# Patient Record
Sex: MALE | Race: WHITE | NOT HISPANIC OR LATINO | Employment: FULL TIME | URBAN - METROPOLITAN AREA
[De-identification: names, ages, dates, MRNs, and addresses within clinical notes are randomized per-mention and may not be internally consistent; named-entity substitution may affect disease eponyms.]

---

## 2023-08-17 ENCOUNTER — PATIENT MESSAGE (OUTPATIENT)
Dept: ADMINISTRATIVE | Facility: HOSPITAL | Age: 63
End: 2023-08-17
Payer: MEDICARE

## 2023-08-17 ENCOUNTER — OFFICE VISIT (OUTPATIENT)
Dept: PRIMARY CARE CLINIC | Facility: CLINIC | Age: 63
End: 2023-08-17
Payer: MEDICARE

## 2023-08-17 VITALS
HEART RATE: 67 BPM | SYSTOLIC BLOOD PRESSURE: 136 MMHG | BODY MASS INDEX: 27.86 KG/M2 | OXYGEN SATURATION: 97 % | WEIGHT: 177.5 LBS | DIASTOLIC BLOOD PRESSURE: 70 MMHG | HEIGHT: 67 IN

## 2023-08-17 DIAGNOSIS — Z87.39 HISTORY OF GOUT: ICD-10-CM

## 2023-08-17 DIAGNOSIS — Z79.899 ENCOUNTER FOR LONG-TERM (CURRENT) USE OF MEDICATIONS: ICD-10-CM

## 2023-08-17 DIAGNOSIS — N40.1 BENIGN PROSTATIC HYPERPLASIA WITH NOCTURIA: ICD-10-CM

## 2023-08-17 DIAGNOSIS — K21.9 GASTROESOPHAGEAL REFLUX DISEASE WITHOUT ESOPHAGITIS: ICD-10-CM

## 2023-08-17 DIAGNOSIS — R35.1 BENIGN PROSTATIC HYPERPLASIA WITH NOCTURIA: ICD-10-CM

## 2023-08-17 DIAGNOSIS — S61.012A LACERATION OF LEFT THUMB WITHOUT FOREIGN BODY WITHOUT DAMAGE TO NAIL, INITIAL ENCOUNTER: ICD-10-CM

## 2023-08-17 DIAGNOSIS — Z11.59 SCREENING FOR VIRAL DISEASE: ICD-10-CM

## 2023-08-17 DIAGNOSIS — E78.2 MIXED HYPERLIPIDEMIA: ICD-10-CM

## 2023-08-17 DIAGNOSIS — Z00.00 ANNUAL PHYSICAL EXAM: Primary | ICD-10-CM

## 2023-08-17 DIAGNOSIS — F51.01 PRIMARY INSOMNIA: ICD-10-CM

## 2023-08-17 DIAGNOSIS — Z80.42 FAMILY HISTORY OF MALIGNANT NEOPLASM OF PROSTATE: ICD-10-CM

## 2023-08-17 DIAGNOSIS — I10 ESSENTIAL HYPERTENSION: ICD-10-CM

## 2023-08-17 PROBLEM — M10.9 GOUT: Status: ACTIVE | Noted: 2023-08-17

## 2023-08-17 PROCEDURE — 3075F PR MOST RECENT SYSTOLIC BLOOD PRESS GE 130-139MM HG: ICD-10-PCS | Mod: CPTII,S$GLB,, | Performed by: INTERNAL MEDICINE

## 2023-08-17 PROCEDURE — 1159F MED LIST DOCD IN RCRD: CPT | Mod: CPTII,S$GLB,, | Performed by: INTERNAL MEDICINE

## 2023-08-17 PROCEDURE — 3008F PR BODY MASS INDEX (BMI) DOCUMENTED: ICD-10-PCS | Mod: CPTII,S$GLB,, | Performed by: INTERNAL MEDICINE

## 2023-08-17 PROCEDURE — 3008F BODY MASS INDEX DOCD: CPT | Mod: CPTII,S$GLB,, | Performed by: INTERNAL MEDICINE

## 2023-08-17 PROCEDURE — 1159F PR MEDICATION LIST DOCUMENTED IN MEDICAL RECORD: ICD-10-PCS | Mod: CPTII,S$GLB,, | Performed by: INTERNAL MEDICINE

## 2023-08-17 PROCEDURE — 99999 PR PBB SHADOW E&M-NEW PATIENT-LVL III: ICD-10-PCS | Mod: PBBFAC,,, | Performed by: INTERNAL MEDICINE

## 2023-08-17 PROCEDURE — 99396 PR PREVENTIVE VISIT,EST,40-64: ICD-10-PCS | Mod: S$GLB,,, | Performed by: INTERNAL MEDICINE

## 2023-08-17 PROCEDURE — 3075F SYST BP GE 130 - 139MM HG: CPT | Mod: CPTII,S$GLB,, | Performed by: INTERNAL MEDICINE

## 2023-08-17 PROCEDURE — 99999 PR PBB SHADOW E&M-NEW PATIENT-LVL III: CPT | Mod: PBBFAC,,, | Performed by: INTERNAL MEDICINE

## 2023-08-17 PROCEDURE — 3078F PR MOST RECENT DIASTOLIC BLOOD PRESSURE < 80 MM HG: ICD-10-PCS | Mod: CPTII,S$GLB,, | Performed by: INTERNAL MEDICINE

## 2023-08-17 PROCEDURE — 3078F DIAST BP <80 MM HG: CPT | Mod: CPTII,S$GLB,, | Performed by: INTERNAL MEDICINE

## 2023-08-17 PROCEDURE — 99396 PREV VISIT EST AGE 40-64: CPT | Mod: S$GLB,,, | Performed by: INTERNAL MEDICINE

## 2023-08-17 RX ORDER — DOXAZOSIN 2 MG/1
2 TABLET ORAL NIGHTLY
COMMUNITY

## 2023-08-17 RX ORDER — OMEPRAZOLE 20 MG/1
20 CAPSULE, DELAYED RELEASE ORAL DAILY
Qty: 90 CAPSULE | Refills: 3 | Status: SHIPPED | OUTPATIENT
Start: 2023-08-17

## 2023-08-17 RX ORDER — AMLODIPINE BESYLATE 5 MG/1
5 TABLET ORAL DAILY
Qty: 90 TABLET | Refills: 3 | Status: SHIPPED | OUTPATIENT
Start: 2023-08-17

## 2023-08-17 RX ORDER — ALLOPURINOL 300 MG/1
300 TABLET ORAL DAILY
Qty: 90 TABLET | Refills: 3 | Status: SHIPPED | OUTPATIENT
Start: 2023-08-17

## 2023-08-17 RX ORDER — ATORVASTATIN CALCIUM 10 MG/1
10 TABLET, FILM COATED ORAL DAILY
Qty: 90 TABLET | Refills: 3 | Status: SHIPPED | OUTPATIENT
Start: 2023-08-17

## 2023-08-17 NOTE — PROGRESS NOTES
[facility] Note    Chief Complaint      Chief Complaint   Patient presents with    Establish Care     History of Present Illness      Nolan Mccarthy is a 62 y.o. male who presents today for Annual preventative visit.  Patient comes to appointment alone.  GI: Shea, Uro: Gadiel    Patient is a nonsmoker.  Patient reports wearing seatbelt when riding in a car.  Patient denies falls since last visit and does not use a mobility aid.      Has laceration on thumb from using  last night, UTD on TDap.    Problem List Items Addressed This Visit       Gastroesophageal reflux disease without esophagitis    Current Assessment & Plan     Stable on prilosec 20 mg, no reflux at present. Had EGD with Dr. Valdivia, has hiatal hernia.  Due for both scopes in 2025.         Mixed hyperlipidemia    Current Assessment & Plan     Stable on lipitor 10 mg daily, no myalgias.  The 10-year ASCVD risk score (Aixa SANDOVAL, et al., 2019) is: 17.1%    Values used to calculate the score:      Age: 62 years      Sex: Male      Is Non- : No      Diabetic: No      Tobacco smoker: Yes      Systolic Blood Pressure: 136 mmHg      Is BP treated: Yes      HDL Cholesterol: 50 mg/dL      Total Cholesterol: 146 mg/dL           Relevant Orders    CBC Auto Differential    Lipid Panel    Comprehensive Metabolic Panel    Essential hypertension    Current Assessment & Plan     BP controlled on norvasc 5 mg, no CP/SOB.         History of gout    Current Assessment & Plan     Stable on allopurinol, no flare at present.         Relevant Medications    allopurinoL (ZYLOPRIM) 300 MG tablet    Other Relevant Orders    URIC ACID    Family history of malignant neoplasm of prostate    Current Assessment & Plan     In brother, sees Dr. Ramesh and gets PSA.           Benign prostatic hyperplasia with nocturia    Current Assessment & Plan     Stable on Cardura, sees Dr. Ramesh.         Primary insomnia    Current  Assessment & Plan     Has trouble staying asleep, no issues staying asleep.  Takes CBD gummies which seem to help.          Other Visit Diagnoses       Annual physical exam    -  Primary    Laceration of left thumb without foreign body without damage to nail, initial encounter        Encounter for long-term (current) use of medications        Relevant Orders    Hemoglobin A1C    Screening for viral disease        Relevant Orders    Hepatitis C Antibody            Health Maintenance   Topic Date Due    Hepatitis C Screening  Never done    Colorectal Cancer Screening  Never done    PROSTATE-SPECIFIC ANTIGEN  09/12/2015    Lipid Panel  06/28/2027    TETANUS VACCINE  02/21/2028    Shingles Vaccine  Completed       Past Medical History:   Diagnosis Date    BMI 28.0-28.9,adult     Hyperlipidemia     Hypertension     Syncope        Past Surgical History:   Procedure Laterality Date    COSMETIC SURGERY      EYE SURGERY      NONE      VASECTOMY         family history includes Cancer in his brother and father.    Social History     Tobacco Use    Smoking status: Some Days     Current packs/day: 0.00     Types: Cigars    Smokeless tobacco: Never   Substance Use Topics    Alcohol use: Yes     Alcohol/week: 11.0 standard drinks of alcohol     Types: 2 Glasses of wine, 6 Cans of beer, 3 Shots of liquor per week     Comment: SOCIALLY    Drug use: Yes     Frequency: 2.0 times per week     Types: Marijuana       Review of Systems   Constitutional:  Negative for chills and fever.   HENT:  Negative for hearing loss.    Eyes:  Negative for discharge.   Respiratory:  Negative for cough, shortness of breath and wheezing.    Cardiovascular:  Negative for chest pain and palpitations.   Gastrointestinal:  Negative for blood in stool, constipation, diarrhea, nausea and vomiting.   Genitourinary:  Negative for dysuria, hematuria and urgency.   Musculoskeletal:  Negative for falls and neck pain.   Neurological:  Negative for weakness and  "headaches.   Endo/Heme/Allergies:  Negative for polydipsia.        Outpatient Encounter Medications as of 8/17/2023   Medication Sig Dispense Refill    doxazosin (CARDURA) 2 MG tablet Take 2 mg by mouth every evening.      fish oil-omega-3 fatty acids 300-1,000 mg capsule Take 2 g by mouth once daily.      multivitamin (MULTIVITAMIN) per tablet Take 1 tablet by mouth once daily.        [DISCONTINUED] allopurinol (ZYLOPRIM) 300 MG tablet TAKE 1 TABLET DAILY 90 tablet 2    [DISCONTINUED] amlodipine (NORVASC) 5 MG tablet Take 5 mg by mouth once daily.        [DISCONTINUED] atorvastatin (LIPITOR) 10 MG tablet Take 10 mg by mouth once daily.        [DISCONTINUED] omeprazole (PRILOSEC) 20 MG capsule Take 20 mg by mouth once daily.        allopurinoL (ZYLOPRIM) 300 MG tablet Take 1 tablet (300 mg total) by mouth once daily. 90 tablet 3    amLODIPine (NORVASC) 5 MG tablet Take 1 tablet (5 mg total) by mouth once daily. 90 tablet 3    atorvastatin (LIPITOR) 10 MG tablet Take 1 tablet (10 mg total) by mouth once daily. 90 tablet 3    omeprazole (PRILOSEC) 20 MG capsule Take 1 capsule (20 mg total) by mouth once daily. 90 capsule 3    [DISCONTINUED] aspirin (ECOTRIN) 81 MG EC tablet Take 81 mg by mouth once daily.         No facility-administered encounter medications on file as of 8/17/2023.        Review of patient's allergies indicates:  No Known Allergies    Physical Exam      Vital Signs  Pulse: 67  SpO2: 97 %  BP: 136/70  Pain Score: 0-No pain  Height and Weight  Height: 5' 7" (170.2 cm)  Weight: 80.5 kg (177 lb 7.5 oz)  BSA (Calculated - sq m): 1.95 sq meters  BMI (Calculated): 27.8  Weight in (lb) to have BMI = 25: 159.3]    Physical Exam  Constitutional:       Appearance: He is well-developed.   HENT:      Head: Normocephalic and atraumatic.   Neck:      Thyroid: No thyromegaly.   Cardiovascular:      Rate and Rhythm: Normal rate and regular rhythm.      Heart sounds: No murmur heard.  Pulmonary:      Effort: " "Pulmonary effort is normal. No respiratory distress.      Breath sounds: Normal breath sounds.   Abdominal:      General: There is no distension.      Palpations: Abdomen is soft.      Tenderness: There is no abdominal tenderness.   Skin:     General: Skin is warm and dry.   Neurological:      Mental Status: He is alert and oriented to person, place, and time.   Psychiatric:         Behavior: Behavior normal.          Laboratory:  CBC:  No results for input(s): "WBC", "RBC", "HGB", "HCT", "PLT", "MCV", "MCH", "MCHC" in the last 2160 hours.  CMP:  No results for input(s): "GLU", "CALCIUM", "ALBUMIN", "PROT", "NA", "K", "CO2", "CL", "BUN", "ALKPHOS", "ALT", "AST", "BILITOT" in the last 2160 hours.    Invalid input(s): "CREATININ"  URINALYSIS:  No results for input(s): "COLORU", "CLARITYU", "SPECGRAV", "PHUR", "PROTEINUA", "GLUCOSEU", "BILIRUBINCON", "BLOODU", "WBCU", "RBCU", "BACTERIA", "MUCUS", "NITRITE", "LEUKOCYTESUR", "UROBILINOGEN", "HYALINECASTS" in the last 2160 hours.   LIPIDS:  No results for input(s): "TSH", "HDL", "CHOL", "TRIG", "LDLCALC", "CHOLHDL", "NONHDLCHOL", "TOTALCHOLEST" in the last 2160 hours.  TSH:  No results for input(s): "TSH" in the last 2160 hours.  A1C:  No results for input(s): "HGBA1C" in the last 2160 hours.    Radiology:  No results found in the last 30 days.     Assessment/Plan     Nolan Mccarthy is a 62 y.o.male with:    1. Annual physical exam    2. Laceration of left thumb without foreign body without damage to nail, initial encounter    3. Gastroesophageal reflux disease without esophagitis    4. Mixed hyperlipidemia  - CBC Auto Differential; Future  - Lipid Panel; Future  - Comprehensive Metabolic Panel; Future    5. Essential hypertension    6. Family history of malignant neoplasm of prostate    7. Benign prostatic hyperplasia with nocturia    8. Primary insomnia    9. History of gout  - allopurinoL (ZYLOPRIM) 300 MG tablet; Take 1 tablet (300 mg total) by mouth once daily.  " Dispense: 90 tablet; Refill: 3  - URIC ACID; Future    10. Encounter for long-term (current) use of medications  - Hemoglobin A1C; Future    11. Screening for viral disease  - Hepatitis C Antibody; Future    -labs ordered  -get PSA from [facility] and get cscope from Dr. Valdivia  -Continue current medications and maintain follow up with specialists.    -Follow up in about 1 year (around 8/17/2024) for Annual Visit.       Angelic Robles MD  [facility]        Answers submitted by the patient for this visit:  Review of Systems Questionnaire (Submitted on 8/10/2023)  activity change: No  unexpected weight change: No  rhinorrhea: No  trouble swallowing: No  visual disturbance: No  chest tightness: No  polyuria: No  difficulty urinating: No  joint swelling: No  arthralgias: No  confusion: No  dysphoric mood: No

## 2023-08-17 NOTE — ASSESSMENT & PLAN NOTE
Stable on prilosec 20 mg, no reflux at present. Had EGD with Dr. Valdivia, has hiatal hernia.  Due for both scopes in 2025.

## 2023-08-17 NOTE — ASSESSMENT & PLAN NOTE
Stable on lipitor 10 mg daily, no myalgias.  The 10-year ASCVD risk score (Aixa SANDOVAL, et al., 2019) is: 17.1%    Values used to calculate the score:      Age: 62 years      Sex: Male      Is Non- : No      Diabetic: No      Tobacco smoker: Yes      Systolic Blood Pressure: 136 mmHg      Is BP treated: Yes      HDL Cholesterol: 50 mg/dL      Total Cholesterol: 146 mg/dL

## 2023-08-24 ENCOUNTER — TELEPHONE (OUTPATIENT)
Dept: PRIMARY CARE CLINIC | Facility: CLINIC | Age: 63
End: 2023-08-24
Payer: MEDICARE

## 2023-08-24 DIAGNOSIS — D64.9 ANEMIA, UNSPECIFIED TYPE: Primary | ICD-10-CM

## 2023-08-24 NOTE — TELEPHONE ENCOUNTER
----- Message from Angelic Robles MD sent at 8/24/2023  3:29 PM CDT -----  Schedule iron labs please

## 2023-08-29 ENCOUNTER — PATIENT MESSAGE (OUTPATIENT)
Dept: PRIMARY CARE CLINIC | Facility: CLINIC | Age: 63
End: 2023-08-29
Payer: MEDICARE

## 2023-09-12 ENCOUNTER — OFFICE VISIT (OUTPATIENT)
Dept: GASTROENTEROLOGY | Facility: CLINIC | Age: 63
End: 2023-09-12
Payer: MEDICARE

## 2023-09-12 VITALS
OXYGEN SATURATION: 97 % | WEIGHT: 173.5 LBS | HEIGHT: 67 IN | SYSTOLIC BLOOD PRESSURE: 138 MMHG | HEART RATE: 56 BPM | DIASTOLIC BLOOD PRESSURE: 66 MMHG | BODY MASS INDEX: 27.23 KG/M2

## 2023-09-12 DIAGNOSIS — D50.8 OTHER IRON DEFICIENCY ANEMIA: Primary | ICD-10-CM

## 2023-09-12 DIAGNOSIS — K21.9 GASTROESOPHAGEAL REFLUX DISEASE WITHOUT ESOPHAGITIS: ICD-10-CM

## 2023-09-12 PROBLEM — D50.9 IRON DEFICIENCY ANEMIA: Status: ACTIVE | Noted: 2023-09-12

## 2023-09-12 PROCEDURE — 99999 PR PBB SHADOW E&M-EST. PATIENT-LVL V: CPT | Mod: PBBFAC,,, | Performed by: NURSE PRACTITIONER

## 2023-09-12 PROCEDURE — 1159F MED LIST DOCD IN RCRD: CPT | Mod: CPTII,S$GLB,, | Performed by: NURSE PRACTITIONER

## 2023-09-12 PROCEDURE — 3044F HG A1C LEVEL LT 7.0%: CPT | Mod: CPTII,S$GLB,, | Performed by: NURSE PRACTITIONER

## 2023-09-12 PROCEDURE — 3078F DIAST BP <80 MM HG: CPT | Mod: CPTII,S$GLB,, | Performed by: NURSE PRACTITIONER

## 2023-09-12 PROCEDURE — 1160F RVW MEDS BY RX/DR IN RCRD: CPT | Mod: CPTII,S$GLB,, | Performed by: NURSE PRACTITIONER

## 2023-09-12 PROCEDURE — 99999 PR PBB SHADOW E&M-EST. PATIENT-LVL V: ICD-10-PCS | Mod: PBBFAC,,, | Performed by: NURSE PRACTITIONER

## 2023-09-12 PROCEDURE — 3008F PR BODY MASS INDEX (BMI) DOCUMENTED: ICD-10-PCS | Mod: CPTII,S$GLB,, | Performed by: NURSE PRACTITIONER

## 2023-09-12 PROCEDURE — 1159F PR MEDICATION LIST DOCUMENTED IN MEDICAL RECORD: ICD-10-PCS | Mod: CPTII,S$GLB,, | Performed by: NURSE PRACTITIONER

## 2023-09-12 PROCEDURE — 1101F PT FALLS ASSESS-DOCD LE1/YR: CPT | Mod: CPTII,S$GLB,, | Performed by: NURSE PRACTITIONER

## 2023-09-12 PROCEDURE — 3288F FALL RISK ASSESSMENT DOCD: CPT | Mod: CPTII,S$GLB,, | Performed by: NURSE PRACTITIONER

## 2023-09-12 PROCEDURE — 3075F SYST BP GE 130 - 139MM HG: CPT | Mod: CPTII,S$GLB,, | Performed by: NURSE PRACTITIONER

## 2023-09-12 PROCEDURE — 99214 OFFICE O/P EST MOD 30 MIN: CPT | Mod: S$GLB,,, | Performed by: NURSE PRACTITIONER

## 2023-09-12 PROCEDURE — 99214 PR OFFICE/OUTPT VISIT, EST, LEVL IV, 30-39 MIN: ICD-10-PCS | Mod: S$GLB,,, | Performed by: NURSE PRACTITIONER

## 2023-09-12 PROCEDURE — 3008F BODY MASS INDEX DOCD: CPT | Mod: CPTII,S$GLB,, | Performed by: NURSE PRACTITIONER

## 2023-09-12 PROCEDURE — 1160F PR REVIEW ALL MEDS BY PRESCRIBER/CLIN PHARMACIST DOCUMENTED: ICD-10-PCS | Mod: CPTII,S$GLB,, | Performed by: NURSE PRACTITIONER

## 2023-09-12 PROCEDURE — 1101F PR PT FALLS ASSESS DOC 0-1 FALLS W/OUT INJ PAST YR: ICD-10-PCS | Mod: CPTII,S$GLB,, | Performed by: NURSE PRACTITIONER

## 2023-09-12 PROCEDURE — 3078F PR MOST RECENT DIASTOLIC BLOOD PRESSURE < 80 MM HG: ICD-10-PCS | Mod: CPTII,S$GLB,, | Performed by: NURSE PRACTITIONER

## 2023-09-12 PROCEDURE — 3288F PR FALLS RISK ASSESSMENT DOCUMENTED: ICD-10-PCS | Mod: CPTII,S$GLB,, | Performed by: NURSE PRACTITIONER

## 2023-09-12 PROCEDURE — 3075F PR MOST RECENT SYSTOLIC BLOOD PRESS GE 130-139MM HG: ICD-10-PCS | Mod: CPTII,S$GLB,, | Performed by: NURSE PRACTITIONER

## 2023-09-12 PROCEDURE — 3044F PR MOST RECENT HEMOGLOBIN A1C LEVEL <7.0%: ICD-10-PCS | Mod: CPTII,S$GLB,, | Performed by: NURSE PRACTITIONER

## 2023-09-12 RX ORDER — SODIUM PICOSULFATE, MAGNESIUM OXIDE, AND ANHYDROUS CITRIC ACID 12; 3.5; 1 G/175ML; G/175ML; MG/175ML
1 LIQUID ORAL ONCE
Qty: 350 ML | Refills: 0 | Status: SHIPPED | OUTPATIENT
Start: 2023-09-12 | End: 2023-09-12

## 2023-09-12 NOTE — PATIENT INSTRUCTIONS
CLENPIQ Instructions    You are scheduled for a EGD/colonoscopy with Dr. Hardin on 10/16/2023 at [facility].   To ensure that your test is accurate and complete, you MUST follow these instructions listed below.  If you have any questions, please call our office at [phone].  Plan on being at the hospital for your procedure for 3-4 hours. Please contact the office at [phone] two days prior to procedure date if reschedule is needed.    1.  Follow a CLEAR LIQUID DIET for the entire day before your scheduled colonoscopy.  This means no solid food the entire day starting when you wake.  You may have as much of the clear liquids as you want throughout the day.   CLEAR LIQUID DIET:   - Avoid Red, Orange, Purple, and/or Blue food coloring   - NO DAIRY   - You can have:  Coffee with sugar (no creamer), tea, water, soda, apple or white grape juice, chicken or beef broth/bouillon (no meat, noodles, or veggies), green/yellow popsicles, green/yellow Jell-O, lemonade.    2.  AT 5 pm the evening before your colonoscopy, OPEN ONE (1) BOTTLE OF CLENPIQ AND DRINK THE ENTIRE BOTTLE.  DRINK FIVE (5) 8-OUNCE GLASSES OF WATER (40 OUNCES TOTAL) OVER THE NEXT FIVE (5) HOURS.     3.  The endoscopy department will call you 1 day before your colonoscopy to tell you the exact time to arrive, AND to tell you the exact time to drink the 2nd portion of your prep (which will be FIVE HOURS BEFORE YOUR ARRIVAL TIME).  At this time given to you, OPEN THE OTHER ONE (1) BOTTLE OF CLENPIQ AND DRINK THE ENTIRE BOTTLE.  DRINK THREE (3) 8-OUNCE GLASSES OF WATER (24 OUNCES TOTAL) OVER THE NEXT THREE (3) HOURS. Once this is complete, you can not have anything else by mouth!    4.  You must have someone with you to DRIVE YOU HOME since you will be receiving IV sedation for the colonoscopy.    5.  It is ok to take MOST of your REGULAR MEDICATIONS  in the morning of your test with a SIP of water.  THE ONLY MEDS YOU NEED TO HOLD ARE YOUR  DIABETES MEDICATIONS,  SOME BLOOD PRESSURE MEDS, AND BLOOD THINNERS IF OK'D BY YOUR DOCTOR.  Do NOT have anything else to eat or drink the morning of your colonoscopy.  It is ok to brush your teeth.    6.  If you are on blood thinners THAT YOU HAVE BEEN INSTRUCTED TO HOLD BY YOUR DOCTOR FOR THIS PROCEDURE, then do NOT take this the morning of your colonoscopy.  Do NOT stop these medications on your own, they must be approved to be held by your doctor.  Your colonoscopy can NOT be done if you are on these medications.  Examples of blood thinners include: Coumadin, Aggrenox, Plavix, Pradaxa, Reapro, Pletal, Xarelto, Ticagrelor, Brilinta, Eliquis, and high dose aspirin (325 mg).  You do not have to stop baby aspirin 81 mg.    7.  IF YOU ARE DIABETIC:  NO INSULIN OR ORAL MEDICATIONS THE MORNING OF THE COLONOSCOPY.  TAKE ONLY HALF THE DOSE OF YOUR INSULIN THE DAY BEFORE THE COLONOSCOPY.  DO NOT TAKE ANY ORAL DIABETIC MEDICATIONS THE DAY BEFORE THE COLONOSCOPY.  IF YOU ARE AN INSULIN DEPENDENT DIABETIC WITH UNSTABLE BLOOD SUGARS, NOTIFY YOUR PRIMARY CARE PHYSICIAN FOR INSTRUCTIONS.    8.  Please DO use your inhalers the morning of your procedure.

## 2023-09-12 NOTE — PROGRESS NOTES
Subjective:       Patient ID: Nolan Mccarthy is a 63 y.o. male.    Chief Complaint: Anemia    64 y/o male with pmhx of HTN, HLD, and GERD referred by PCP for ANGIE. Patient denies any overt signs of GI bleed including hematochezia, hematochezia, or melena. Has GERD controlled with daily PPI. No abdominal pain, nausea, vomiting, or unintentional weight loss. Intermittent thoracic back pain. Last endoscopy in 2015 with Dr. Valdivia at [facility]; does not recall results.       Component      Latest Ref Rng 8/23/2023 8/29/2023   WBC      3.90 - 12.70 K/uL 6.30     RBC      4.60 - 6.20 M/uL 4.56 (L)     Hemoglobin      14.0 - 18.0 g/dL 12.1 (L)     Hematocrit      40.0 - 54.0 % 37.3 (L)     MCV      82 - 98 fL 82     MCH      27.0 - 31.0 pg 26.5 (L)     MCHC      32.0 - 36.0 g/dL 32.4     RDW      11.5 - 14.5 % 14.1     Platelets      150 - 450 K/uL 208     MPV      9.2 - 12.9 fL 10.3     Immature Granulocytes      0.0 - 0.5 % 0.3     Gran # (ANC)      1.8 - 7.7 K/uL 4.2     Immature Grans (Abs)      0.00 - 0.04 K/uL 0.02     Lymph #      1.0 - 4.8 K/uL 1.6     Mono #      0.3 - 1.0 K/uL 0.5     Eos #      0.0 - 0.5 K/uL 0.1     Baso #      0.00 - 0.20 K/uL 0.02     nRBC      0 /100 WBC 0     Gran %      38.0 - 73.0 % 65.9     Lymph %      18.0 - 48.0 % 25.1     Mono %      4.0 - 15.0 % 7.6     Eosinophil %      0.0 - 8.0 % 0.8     Basophil %      0.0 - 1.9 % 0.3     Differential Method Automated     Iron      45 - 160 ug/dL  49    Transferrin      200 - 375 mg/dL  333    TIBC      250 - 450 ug/dL  493 (H)    Saturated Iron      20 - 50 %  10 (L)    Ferritin      20.0 - 300.0 ng/mL  11 (L)         Past Medical History:   Diagnosis Date    BMI 28.0-28.9,adult     Hyperlipidemia     Hypertension     Syncope        Past Surgical History:   Procedure Laterality Date    COSMETIC SURGERY      EYE SURGERY      NONE      VASECTOMY         Family History   Problem Relation Age of Onset    Cancer Father     Cancer Brother        Social  "History     Socioeconomic History    Marital status:    Tobacco Use    Smoking status: Some Days     Types: Cigars    Smokeless tobacco: Never   Substance and Sexual Activity    Alcohol use: Yes     Alcohol/week: 11.0 standard drinks of alcohol     Types: 2 Glasses of wine, 6 Cans of beer, 3 Shots of liquor per week     Comment: SOCIALLY    Drug use: Yes     Frequency: 2.0 times per week     Types: Marijuana    Sexual activity: Yes     Partners: Female       Review of Systems   Constitutional:  Negative for appetite change and unexpected weight change.   HENT:  Negative for trouble swallowing.    Respiratory:  Negative for shortness of breath.    Cardiovascular:  Negative for chest pain.   Gastrointestinal:  Negative for abdominal pain, blood in stool, constipation, diarrhea, nausea and vomiting.   Musculoskeletal:  Positive for back pain.   Psychiatric/Behavioral:  Negative for dysphoric mood.          Objective:     Vitals:    09/12/23 0810   BP: 138/66   BP Location: Right arm   Patient Position: Sitting   BP Method: Medium (Manual)   Pulse: (!) 56   SpO2: 97%   Weight: 78.7 kg (173 lb 8 oz)   Height: 5' 7" (1.702 m)          Physical Exam  Constitutional:       General: He is not in acute distress.     Appearance: Normal appearance.   HENT:      Head: Normocephalic.   Eyes:      Conjunctiva/sclera: Conjunctivae normal.   Pulmonary:      Effort: Pulmonary effort is normal. No respiratory distress.   Musculoskeletal:         General: Normal range of motion.      Cervical back: Normal range of motion.   Skin:     General: Skin is warm and dry.   Neurological:      Mental Status: He is alert and oriented to person, place, and time.   Psychiatric:         Mood and Affect: Mood normal.         Behavior: Behavior normal.               Assessment:         ICD-10-CM ICD-9-CM   1. Other iron deficiency anemia  D50.8 280.8   2. Gastroesophageal reflux disease without esophagitis  K21.9 530.81       Plan:       Other " iron deficiency anemia  -     Ambulatory referral/consult to Gastroenterology  -     sod picosulf-mag ox-citric ac (CLENPIQ) 10 mg-3.5 gram- 12 gram/175 mL Soln; Take 1 Package by mouth once. for 1 dose  Dispense: 350 mL; Refill: 0  -     Case Request Endoscopy: EGD (ESOPHAGOGASTRODUODENOSCOPY), COLONOSCOPY    Gastroesophageal reflux disease without esophagitis        -     Continue omeprazole daily    Follow up if symptoms worsen or fail to improve.     Patient's Medications   New Prescriptions    SOD PICOSULF-MAG OX-CITRIC AC (CLENPIQ) 10 MG-3.5 GRAM- 12 GRAM/175 ML SOLN    Take 1 Package by mouth once. for 1 dose   Previous Medications    ALLOPURINOL (ZYLOPRIM) 300 MG TABLET    Take 1 tablet (300 mg total) by mouth once daily.    AMLODIPINE (NORVASC) 5 MG TABLET    Take 1 tablet (5 mg total) by mouth once daily.    ATORVASTATIN (LIPITOR) 10 MG TABLET    Take 1 tablet (10 mg total) by mouth once daily.    DOXAZOSIN (CARDURA) 2 MG TABLET    Take 2 mg by mouth every evening.    FISH OIL-OMEGA-3 FATTY ACIDS 300-1,000 MG CAPSULE    Take 2 g by mouth once daily.    MULTIVITAMIN (MULTIVITAMIN) PER TABLET    Take 1 tablet by mouth once daily.      OMEPRAZOLE (PRILOSEC) 20 MG CAPSULE    Take 1 capsule (20 mg total) by mouth once daily.   Modified Medications    No medications on file   Discontinued Medications    No medications on file

## 2023-09-15 ENCOUNTER — TELEPHONE (OUTPATIENT)
Dept: GASTROENTEROLOGY | Facility: CLINIC | Age: 63
End: 2023-09-15
Payer: MEDICARE

## 2023-09-15 NOTE — TELEPHONE ENCOUNTER
Contacted patient to notify him that 09/28 and 09/29 has open up for his upcoming procedure and offered patient to move procedure to one of those dates, Patient stated that he cannot schedule because he has an MRI for those dates. Patient informed that I will keep him on the wait list if any other dates become available.    ----- Message from ROBERT Alberto sent at 9/15/2023  9:08 AM CDT -----  Patient requested to be placed on waiting list for endoscopy. Notify patient Thursday, 9/28 and Friday, 9/29 are available if he would like earlier date.

## 2023-10-06 ENCOUNTER — PATIENT OUTREACH (OUTPATIENT)
Dept: ADMINISTRATIVE | Facility: HOSPITAL | Age: 63
End: 2023-10-06
Payer: MEDICARE

## 2023-10-06 NOTE — PROGRESS NOTES
Health Maintenance Due   Topic Date Due    Pneumococcal Vaccines (Age 65+) (1 - PCV) Never done    HIV Screening  Never done    Colorectal Cancer Screening  Never done    Abdominal Aortic Aneurysm Screening  Never done    COVID-19 Vaccine (4 - 2023-24 season) 09/01/2023        updated. Triggered  LINKS and Care everywhere. Imported PSA results. Patient is scheduled for colonoscopy.    Lashell Freitas LPN   Clinical Care Coordinator  [facility]

## 2023-10-07 ENCOUNTER — PATIENT MESSAGE (OUTPATIENT)
Dept: ADMINISTRATIVE | Facility: HOSPITAL | Age: 63
End: 2023-10-07
Payer: MEDICARE

## 2023-10-23 ENCOUNTER — TELEPHONE (OUTPATIENT)
Dept: GASTROENTEROLOGY | Facility: CLINIC | Age: 63
End: 2023-10-23
Payer: MEDICARE

## 2023-10-23 NOTE — TELEPHONE ENCOUNTER
Called and spoke with pt about EGD/colonoscopy results. Informed pt that the below are all negative and he will need repeat colonoscopy in 3 years. Informed pt to continue taking his omeprazole everyday and RTC as needed. Pt verbalized understanding.         ----- Message from Natalie Hardin MD sent at 10/23/2023  4:26 PM CDT -----  Celiac (-), HP (-), cont PPI, RTC as needed    Large benign adenoma, repeat 3 years

## 2023-11-02 ENCOUNTER — PATIENT MESSAGE (OUTPATIENT)
Dept: PRIMARY CARE CLINIC | Facility: CLINIC | Age: 63
End: 2023-11-02
Payer: MEDICARE

## 2023-11-02 DIAGNOSIS — D64.9 ANEMIA, UNSPECIFIED TYPE: Primary | ICD-10-CM

## 2023-12-19 ENCOUNTER — LAB VISIT (OUTPATIENT)
Dept: LAB | Facility: HOSPITAL | Age: 63
End: 2023-12-19
Attending: INTERNAL MEDICINE
Payer: MEDICARE

## 2023-12-19 ENCOUNTER — OFFICE VISIT (OUTPATIENT)
Dept: HEMATOLOGY/ONCOLOGY | Facility: CLINIC | Age: 63
End: 2023-12-19
Payer: MEDICARE

## 2023-12-19 VITALS
DIASTOLIC BLOOD PRESSURE: 70 MMHG | RESPIRATION RATE: 18 BRPM | WEIGHT: 175.94 LBS | OXYGEN SATURATION: 97 % | BODY MASS INDEX: 27.55 KG/M2 | SYSTOLIC BLOOD PRESSURE: 154 MMHG | HEART RATE: 76 BPM

## 2023-12-19 DIAGNOSIS — D50.8 OTHER IRON DEFICIENCY ANEMIA: Primary | ICD-10-CM

## 2023-12-19 DIAGNOSIS — D64.9 ANEMIA, UNSPECIFIED TYPE: ICD-10-CM

## 2023-12-19 DIAGNOSIS — D50.8 OTHER IRON DEFICIENCY ANEMIA: ICD-10-CM

## 2023-12-19 LAB
BASOPHILS # BLD AUTO: 0.03 K/UL (ref 0–0.2)
BASOPHILS NFR BLD: 0.4 % (ref 0–1.9)
DIFFERENTIAL METHOD: ABNORMAL
EOSINOPHIL # BLD AUTO: 0.1 K/UL (ref 0–0.5)
EOSINOPHIL NFR BLD: 0.8 % (ref 0–8)
ERYTHROCYTE [DISTWIDTH] IN BLOOD BY AUTOMATED COUNT: 14.4 % (ref 11.5–14.5)
FERRITIN SERPL-MCNC: 26 NG/ML (ref 20–300)
HCT VFR BLD AUTO: 40 % (ref 40–54)
HGB BLD-MCNC: 13.1 G/DL (ref 14–18)
IMM GRANULOCYTES # BLD AUTO: 0.03 K/UL (ref 0–0.04)
IMM GRANULOCYTES NFR BLD AUTO: 0.4 % (ref 0–0.5)
IRON SERPL-MCNC: 122 UG/DL (ref 45–160)
LYMPHOCYTES # BLD AUTO: 1.3 K/UL (ref 1–4.8)
LYMPHOCYTES NFR BLD: 17.4 % (ref 18–48)
MCH RBC QN AUTO: 28.1 PG (ref 27–31)
MCHC RBC AUTO-ENTMCNC: 32.8 G/DL (ref 32–36)
MCV RBC AUTO: 86 FL (ref 82–98)
MONOCYTES # BLD AUTO: 0.7 K/UL (ref 0.3–1)
MONOCYTES NFR BLD: 9.6 % (ref 4–15)
NEUTROPHILS # BLD AUTO: 5.3 K/UL (ref 1.8–7.7)
NEUTROPHILS NFR BLD: 71.4 % (ref 38–73)
NRBC BLD-RTO: 0 /100 WBC
PLATELET # BLD AUTO: 272 K/UL (ref 150–450)
PMV BLD AUTO: 9.6 FL (ref 9.2–12.9)
RBC # BLD AUTO: 4.66 M/UL (ref 4.6–6.2)
SATURATED IRON: 31 % (ref 20–50)
TOTAL IRON BINDING CAPACITY: 391 UG/DL (ref 250–450)
TRANSFERRIN SERPL-MCNC: 264 MG/DL (ref 200–375)
WBC # BLD AUTO: 7.48 K/UL (ref 3.9–12.7)

## 2023-12-19 PROCEDURE — 1159F MED LIST DOCD IN RCRD: CPT | Mod: HCNC,CPTII,S$GLB, | Performed by: INTERNAL MEDICINE

## 2023-12-19 PROCEDURE — 84466 ASSAY OF TRANSFERRIN: CPT | Mod: HCNC | Performed by: INTERNAL MEDICINE

## 2023-12-19 PROCEDURE — 3077F SYST BP >= 140 MM HG: CPT | Mod: HCNC,CPTII,S$GLB, | Performed by: INTERNAL MEDICINE

## 2023-12-19 PROCEDURE — 99999 PR PBB SHADOW E&M-EST. PATIENT-LVL IV: CPT | Mod: PBBFAC,HCNC,, | Performed by: INTERNAL MEDICINE

## 2023-12-19 PROCEDURE — 99999 PR PBB SHADOW E&M-EST. PATIENT-LVL IV: ICD-10-PCS | Mod: PBBFAC,HCNC,, | Performed by: INTERNAL MEDICINE

## 2023-12-19 PROCEDURE — 3288F PR FALLS RISK ASSESSMENT DOCUMENTED: ICD-10-PCS | Mod: HCNC,CPTII,S$GLB, | Performed by: INTERNAL MEDICINE

## 2023-12-19 PROCEDURE — 99204 OFFICE O/P NEW MOD 45 MIN: CPT | Mod: HCNC,S$GLB,, | Performed by: INTERNAL MEDICINE

## 2023-12-19 PROCEDURE — 1101F PR PT FALLS ASSESS DOC 0-1 FALLS W/OUT INJ PAST YR: ICD-10-PCS | Mod: HCNC,CPTII,S$GLB, | Performed by: INTERNAL MEDICINE

## 2023-12-19 PROCEDURE — 3044F PR MOST RECENT HEMOGLOBIN A1C LEVEL <7.0%: ICD-10-PCS | Mod: HCNC,CPTII,S$GLB, | Performed by: INTERNAL MEDICINE

## 2023-12-19 PROCEDURE — 1101F PT FALLS ASSESS-DOCD LE1/YR: CPT | Mod: HCNC,CPTII,S$GLB, | Performed by: INTERNAL MEDICINE

## 2023-12-19 PROCEDURE — 99204 PR OFFICE/OUTPT VISIT, NEW, LEVL IV, 45-59 MIN: ICD-10-PCS | Mod: HCNC,S$GLB,, | Performed by: INTERNAL MEDICINE

## 2023-12-19 PROCEDURE — 1160F RVW MEDS BY RX/DR IN RCRD: CPT | Mod: HCNC,CPTII,S$GLB, | Performed by: INTERNAL MEDICINE

## 2023-12-19 PROCEDURE — 83540 ASSAY OF IRON: CPT | Mod: HCNC | Performed by: INTERNAL MEDICINE

## 2023-12-19 PROCEDURE — 82728 ASSAY OF FERRITIN: CPT | Mod: HCNC | Performed by: INTERNAL MEDICINE

## 2023-12-19 PROCEDURE — 3078F DIAST BP <80 MM HG: CPT | Mod: HCNC,CPTII,S$GLB, | Performed by: INTERNAL MEDICINE

## 2023-12-19 PROCEDURE — 3078F PR MOST RECENT DIASTOLIC BLOOD PRESSURE < 80 MM HG: ICD-10-PCS | Mod: HCNC,CPTII,S$GLB, | Performed by: INTERNAL MEDICINE

## 2023-12-19 PROCEDURE — 3288F FALL RISK ASSESSMENT DOCD: CPT | Mod: HCNC,CPTII,S$GLB, | Performed by: INTERNAL MEDICINE

## 2023-12-19 PROCEDURE — 3008F BODY MASS INDEX DOCD: CPT | Mod: HCNC,CPTII,S$GLB, | Performed by: INTERNAL MEDICINE

## 2023-12-19 PROCEDURE — 3008F PR BODY MASS INDEX (BMI) DOCUMENTED: ICD-10-PCS | Mod: HCNC,CPTII,S$GLB, | Performed by: INTERNAL MEDICINE

## 2023-12-19 PROCEDURE — 3077F PR MOST RECENT SYSTOLIC BLOOD PRESSURE >= 140 MM HG: ICD-10-PCS | Mod: HCNC,CPTII,S$GLB, | Performed by: INTERNAL MEDICINE

## 2023-12-19 PROCEDURE — 3044F HG A1C LEVEL LT 7.0%: CPT | Mod: HCNC,CPTII,S$GLB, | Performed by: INTERNAL MEDICINE

## 2023-12-19 PROCEDURE — 36415 COLL VENOUS BLD VENIPUNCTURE: CPT | Mod: HCNC | Performed by: INTERNAL MEDICINE

## 2023-12-19 PROCEDURE — 1159F PR MEDICATION LIST DOCUMENTED IN MEDICAL RECORD: ICD-10-PCS | Mod: HCNC,CPTII,S$GLB, | Performed by: INTERNAL MEDICINE

## 2023-12-19 PROCEDURE — 1160F PR REVIEW ALL MEDS BY PRESCRIBER/CLIN PHARMACIST DOCUMENTED: ICD-10-PCS | Mod: HCNC,CPTII,S$GLB, | Performed by: INTERNAL MEDICINE

## 2023-12-19 PROCEDURE — 85025 COMPLETE CBC W/AUTO DIFF WBC: CPT | Mod: HCNC | Performed by: INTERNAL MEDICINE

## 2023-12-20 NOTE — PROGRESS NOTES
PATIENT: Nolan Mccarthy  MRN: [id number]  DATE: 12/19/2023      Subjective:     Chief complaint:  Chief Complaint   Patient presents with    Anemia    Establish Care          History of Present Illness: Mr. Mccarthy presents to hematology clinic accompanied by his wife to establish care for iron deficiency anemia. He has old [facility] records available on his phone which he shows me demonstrating that he had a normal CBC in 2021. This year when his blood has been checked he has had a mild normocytic anemia. Iron and ferritin levels have been low. He was advised to start iron supplementation--he initially was taking it once a day but eventually cut it down to every other day due to constipation with the daily dosing. He has undergone EGD and c-scope, either of which demonstrated deyanira bleeding however there was evidence of inactive (I.e. healing prior) gastritis. He denies overt bleeding. He has a normal diet without significant restriction. He denies SOB or dyspnea, no light-headed ness /dizziness. No constitutional/B-symptoms.       Review of Systems   A comprehensive review of systems was performed; pertinent positives and negatives are noted in the HPI.    Objective:      Vitals:   Vitals:    12/19/23 0845   BP: (!) 154/70   BP Location: Left arm   Patient Position: Sitting   BP Method: Medium (Automatic)   Pulse: 76   Resp: 18   SpO2: 97%   Weight: 79.8 kg (175 lb 14.8 oz)     BMI: Body mass index is 27.55 kg/m².      Physical Exam:   Physical Exam  Vitals and nursing note reviewed.   Constitutional:       General: He is not in acute distress.     Appearance: Normal appearance. He is normal weight. He is not toxic-appearing.   HENT:      Head: Normocephalic and atraumatic.   Eyes:      General: No scleral icterus.     Conjunctiva/sclera: Conjunctivae normal.   Cardiovascular:      Rate and Rhythm: Normal rate and regular rhythm.      Heart sounds: Normal heart sounds.   Pulmonary:      Effort: Pulmonary effort is normal.       Breath sounds: Normal breath sounds. No wheezing, rhonchi or rales.   Abdominal:      General: Abdomen is flat. Bowel sounds are normal.      Palpations: Abdomen is soft.      Tenderness: There is no abdominal tenderness.   Musculoskeletal:         General: No tenderness or deformity.      Cervical back: Neck supple.   Lymphadenopathy:      Cervical: No cervical adenopathy.   Skin:     General: Skin is warm and dry.      Coloration: Skin is not jaundiced.      Findings: No bruising or erythema.   Neurological:      General: No focal deficit present.      Mental Status: He is alert and oriented to person, place, and time. Mental status is at baseline.   Psychiatric:         Mood and Affect: Mood normal.         Behavior: Behavior normal.         Thought Content: Thought content normal.           Laboratory Data:  WBC   Date Value Ref Range Status   12/19/2023 7.48 3.90 - 12.70 K/uL Final     Hemoglobin   Date Value Ref Range Status   12/19/2023 13.1 (L) 14.0 - 18.0 g/dL Final     Hematocrit   Date Value Ref Range Status   12/19/2023 40.0 40.0 - 54.0 % Final     Platelets   Date Value Ref Range Status   12/19/2023 272 150 - 450 K/uL Final     Gran # (ANC)   Date Value Ref Range Status   12/19/2023 5.3 1.8 - 7.7 K/uL Final     Gran %   Date Value Ref Range Status   12/19/2023 71.4 38.0 - 73.0 % Final       Chemistry        Component Value Date/Time     08/23/2023 0721    K 4.2 08/23/2023 0721     08/23/2023 0721    CO2 23 08/23/2023 0721    BUN 14 08/23/2023 0721    CREATININE 1.03 08/23/2023 0721     (H) 08/23/2023 0721        Component Value Date/Time    CALCIUM 9.3 08/23/2023 0721    ALKPHOS 52 08/23/2023 0721    AST 28 08/23/2023 0721    ALT 31 08/23/2023 0721    BILITOT 0.5 08/23/2023 0721    ESTGFRAFRICA 93 07/17/2015 0723    EGFRNONAA 80 07/17/2015 0723        Colonoscopy  Order: [id number]  Status: Final result     Visible to patient: Yes (seen)     Next appt: None     0 Result Notes     1 HM Topic        Narrative  Performed by: [facility]  Patient Name: Nolan Mccarthy   Procedure Date: 10/16/2023 8:39 AM   MRN: [id number]   Account Number: [id number]   YOB: 1960   Age: 63   Room: Room 1   Gender: Male   Attending MD: Natalie Hardin MD, [id number]   Procedure:             Colonoscopy   Indications:           Iron deficiency anemia   Providers:             Natalie Hardin MD, Rashi Ta RN,                          Lisseth Olivarez, Technician   Referring MD:          GRETEL Alberto   Complications:         No immediate complications. Estimated blood loss:                          Minimal.   Estimated Blood Loss:  Estimated blood loss was minimal.   Medicines:             General Anesthesia   Procedure:             Pre-Anesthesia Assessment:                          - Prior to the procedure, a History and Physical                          was performed, and patient medications and                          allergies were reviewed. The patient is competent.                          The risks and benefits of the procedure and the                          sedation options and risks were discussed with the                          patient. All questions were answered and informed                          consent was obtained. Patient identification and                          proposed procedure were verified by the physician,                          the nurse, the anesthesiologist and the                          anesthetist in the pre-procedure area in the                          procedure room. Mental Status Examination: alert                          and oriented. Airway Examination: normal                          oropharyngeal airway and neck mobility.                          Respiratory Examination: clear to auscultation. CV                          Examination: normal. Prophylactic Antibiotics: The                          patient does not require prophylactic  antibiotics.                          Prior Anticoagulants: The patient has taken no                          anticoagulant or antiplatelet agents. ASA Grade                          Assessment: II - A patient with mild systemic                          disease. After reviewing the risks and benefits,                          the patient was deemed in satisfactory condition                          to undergo the procedure. The anesthesia plan was                          to use general anesthesia. Immediately prior to                          administration of medications, the patient was                          re-assessed for adequacy to receive sedatives. The                          heart rate, respiratory rate, oxygen saturations,                          blood pressure, adequacy of pulmonary ventilation,                          and response to care were monitored throughout the                          procedure. The physical status of the patient was                          re-assessed after the procedure.                          After I obtained informed consent, the scope was                          passed under direct vision. Throughout the                          procedure, the patient's blood pressure, pulse,                          and oxygen saturations were monitored continuously.                          The Olympus scope [id number] ([id number]) was                          introduced through the anus and advanced to the                          cecum, identified by appendiceal orifice and                          ileocecal valve. The colonoscopy was unusually                          difficult due to a redundant colon, significant                          looping, inability to hold insufflation and Spasm.                          Successful completion of the procedure was aided                          by straightening and shortening the scope to                          obtain bowel loop  reduction, using scope torsion,                          applying abdominal pressure and receiving                          assistance from additional staff. The patient                          tolerated the procedure well. The quality of the                          bowel preparation was adequate. The ileocecal                          valve, appendiceal orifice, and rectum were                          photographed.   Findings:       The perianal and digital rectal examinations were normal.        There was significant spasm at the hepatic flexure, in the ascending        colon and in the cecum.        The rectum and sigmoid colon were significantly redundant.        A 10 mm polyp was found in the ascending colon. The polyp was        sessile. The polyp was removed with a hot snare. Resection and        retrieval were complete. Verification of patient identification for        the specimen was done by the physician, nurse and technician.        Estimated blood loss was minimal.        A medium post polypectomy scar was found in the distal sigmoid        colon. The scar tissue was healthy in appearance.        Internal hemorrhoids were found during anoscopy. The hemorrhoids        were medium-sized and Grade I (internal hemorrhoids that do not        prolapse).   Impression:            - Significant colonic spasm.                          - Redundant distal colon.                          - One 10 mm polyp in the ascending colon, removed                          with a hot snare. Resected and retrieved.                          - Post-polypectomy scar in the distal sigmoid                          colon.                          - Internal hemorrhoids.   Recommendation:        - Discharge patient to home.                          - Patient has a contact number available for                          emergencies. The signs and symptoms of potential                          delayed complications were discussed with  the                          patient. Return to normal activities tomorrow.                          Written discharge instructions were provided to                          the patient.                          - High fiber diet.                          - Continue present medications.                          - Await pathology results.                          - Repeat colonoscopy in 3 - 5 years for                          surveillance based on pathology results.                          - Return to referring physician as previously                          scheduled.   MD Natalie Ayala MD   10/16/2023 9:37:42 AM   This report has been verified and signed electronically.   Dear patient,   As a result of recent federal legislation (The Federal Cures Act), you may   receive lab or pathology results from your procedure in your [facility]   account before your physician is able to contact you. Your physician or   their representative will relay the results to you with their   recommendations at their soonest availability.   Thank you,   Number of Addenda: 0   Note Initiated On: 10/16/2023 8:39 AM        This report has been verified and signed electronically.      Specimen Collected: 10/16/23 08:39 CDT Last Resulted: 10/16/23 09:39 CDT    Bryan as an Unsuccessful Attempt             Upper GI endoscopy  Order: [id number]  Status: Final result     Visible to patient: Yes (seen)     Next appt: None     0 Result Notes        Narrative  Performed by: [facility]  Patient Name: Nolan Mccarthy   Procedure Date: 10/16/2023 8:39 AM   MRN: [id number]   Account Number: [id number]   YOB: 1960   Age: 63   Room: Room 1   Gender: Male   Attending MD: Natalie Hardin MD, [id number]   Procedure:             Upper GI endoscopy   Indications:           Iron deficiency anemia, Gastro-esophageal reflux                          disease   Providers:             Natalie Hardin MD, Rashi Ta RN,                           Lisseth Olivarez, Technician   Referring MD:          GRETEL Alberto   Complications:         No immediate complications. Estimated blood loss:                          Minimal.   Estimated Blood Loss:  Estimated blood loss was minimal.   Medicines:             General Anesthesia   Procedure:             Pre-Anesthesia Assessment:                          - Prior to the procedure, a History and Physical                          was performed, and patient medications and                          allergies were reviewed. The patient is competent.                          The risks and benefits of the procedure and the                          sedation options and risks were discussed with the                          patient. All questions were answered and informed                          consent was obtained. Patient identification and                          proposed procedure were verified by the physician,                          the nurse, the anesthesiologist and the                          anesthetist in the pre-procedure area in the                          procedure room. Mental Status Examination: alert                          and oriented. Airway Examination: normal                          oropharyngeal airway and neck mobility.                          Respiratory Examination: clear to auscultation. CV                          Examination: normal. Prophylactic Antibiotics: The                          patient does not require prophylactic antibiotics.                          Prior Anticoagulants: The patient has taken no                          anticoagulant or antiplatelet agents. ASA Grade                          Assessment: II - A patient with mild systemic                          disease. After reviewing the risks and benefits,                          the patient was deemed in satisfactory condition                          to undergo the procedure. The  anesthesia plan was                          to use general anesthesia. Immediately prior to                          administration of medications, the patient was                          re-assessed for adequacy to receive sedatives. The                          heart rate, respiratory rate, oxygen saturations,                          blood pressure, adequacy of pulmonary ventilation,                          and response to care were monitored throughout the                          procedure. The physical status of the patient was                          re-assessed after the procedure.                          After obtaining informed consent, the endoscope                          was passed under direct vision. Throughout the                          procedure, the patient's blood pressure, pulse,                          and oxygen saturations were monitored                          continuously. The Olympus scope GIF-                          ([id number]) was introduced through the mouth, and                          advanced to the third part of duodenum. The upper                          GI endoscopy was accomplished without difficulty.                          The patient tolerated the procedure well.   Findings:       The examined esophagus was normal.        Mildly erythematous mucosa was found in the gastric antrum. Biopsies        were taken with a cold forceps for histology. Verification of        patient identification for the specimen was done by the physician,        nurse and technician. Estimated blood loss was minimal.        The examined duodenum was normal. Biopsies for histology were taken        with a cold forceps for evaluation of celiac disease. Verification        of patient identification for the specimen was done by the        physician, nurse and technician. Estimated blood loss was minimal.   Impression:            - Normal esophagus.                          - Erythematous  mucosa in the antrum. Biopsied.                          - Normal examined duodenum. Biopsied.   Recommendation:        - Discharge patient to home.                          - Patient has a contact number available for                          emergencies. The signs and symptoms of potential                          delayed complications were discussed with the                          patient. Return to normal activities tomorrow.                          Written discharge instructions were provided to                          the patient.                          - Resume previous diet.                          - Continue present medications.                          - Await pathology results.                          - Perform a colonoscopy today.   MD Natalie Ayala MD   10/16/2023 9:00:30 AM   This report has been verified and signed electronically.   Dear patient,   As a result of recent federal legislation (The Federal Cures Act), you may   receive lab or pathology results from your procedure in your [facility]   account before your physician is able to contact you. Your physician or   their representative will relay the results to you with their   recommendations at their soonest availability.   Thank you,   Number of Addenda: 0   Note Initiated On: 10/16/2023 8:39 AM        This report has been verified and signed electronically.      Specimen Collected: 10/16/23 08:39 CDT Last Resulted: 10/16/23 09:01 CDT               Assessment/Plan:     1. Other iron deficiency anemia    2. Anemia, unspecified type      Very pleasant 64 yo man diagnosed with ANGIE in August 2023. Per labs patient shared with me on his phone, anemia is relatively new development, as he had normal CBC in 2021. He denies any issues with GI bleeding or bleeding from elsewhere. He had EGD and c-scope in October 2023 which did not demonstrate any active bleeding though there was evidence of inactive gastritis which could  have been part of the etiology of the iron deficiency. He has been on oral iron every other day (due to constipation issues with daily dosing) and his labs when repeated today show normalization of iron and ferritin; his hemoglobin remains a little lower than normal but improved and I would expect that the CBC should when normalize given a little more time. I will place orders for repeat iron/CBC in about 3 months. If those do confirm normalization, no further follow-up would be required at this time. He is advised to follow-up if labs are not normal or if he develops new issues at any time.     Med and Orders:  Orders Placed This Encounter    CBC auto differential    FERRITIN    Iron and TIBC    CBC auto differential    Iron and TIBC    FERRITIN       Follow Up:  Follow up for as needed.      Above care plan was discussed with patient and all questions were addressed to their expressed satisfaction.       Nolan Manzano MD, FACP  Hematology & Medical Oncology  [facility]       Total time of this visit, including time spent face to face with patient and/or via video/audio, and also in preparing for today's visit for MDM and documentation. (Medical Decision Making, including consideration of possible diagnoses, management options, complex medical record review, review of diagnostic tests and information, consideration and discussion of significant complications based on comorbidities, and discussion with providers involved with the care of the patient) 45 minutes. Greater than 50% was spent face to face with the patient counseling and coordinating care.

## 2024-04-15 ENCOUNTER — PATIENT MESSAGE (OUTPATIENT)
Dept: GASTROENTEROLOGY | Facility: CLINIC | Age: 64
End: 2024-04-15
Payer: MEDICARE

## 2024-05-23 ENCOUNTER — TELEPHONE (OUTPATIENT)
Dept: FAMILY MEDICINE | Facility: CLINIC | Age: 64
End: 2024-05-23
Payer: MEDICARE

## 2024-05-23 NOTE — TELEPHONE ENCOUNTER
----- Message from Michelet Sheriff sent at 5/23/2024 12:34 PM CDT -----  Type:  Appointment Request     Name of Caller:CHENTE MEDEROS [[id number]]  When is the first available appointment?No access  Symptoms:Annual Physical  Would the patient rather a call back or a response via [facility]? call  Best Call Back Number:[phone]  Additional Information: Patient is moving to [facility] and would like to establish you as PCP.  Patient states that you were his provider years ago and now that he is moving to the area that you are in, he would like to reestablish you as his PCP.  Please give patient a call back as soon as possible to advise and further assist.

## 2024-06-16 RX ORDER — OMEPRAZOLE 20 MG/1
20 CAPSULE, DELAYED RELEASE ORAL DAILY
Qty: 90 CAPSULE | Refills: 0 | Status: SHIPPED | OUTPATIENT
Start: 2024-06-16

## 2024-06-16 NOTE — TELEPHONE ENCOUNTER
Care Due:                  Date            Visit Type   Department     Provider  --------------------------------------------------------------------------------                                NEW PATIENT                              - OPEN       [facility]  Last Visit: 08-      SCHEDULING   CARE           Angelic Robles  Next Visit: None Scheduled  None         None Found                                                            Last  Test          Frequency    Reason                     Performed    Due Date  --------------------------------------------------------------------------------    CMP.........  12 months..  allopurinoL, atorvastatin  08- 08-    Lipid Panel.  12 months..  atorvastatin.............  08- 08-    Uric Acid...  12 months..  allopurinoL..............  08- 08-    [employer] Embedded Care Due Messages. Reference number: [id number].   6/16/2024 8:07:40 AM CDT

## 2024-06-17 NOTE — TELEPHONE ENCOUNTER
Provider Staff:  Action required for this patient    Requires labs      Please see care gap opportunities below in Care Due Message.    Thanks!  [facility]     Appointments      Date Provider   Last Visit   8/17/2023 Angelic Robles MD   Next Visit   Visit date not found Angelic Robles MD     Refill Decision Note   Nolan Mccarthy  is requesting a refill authorization.  Brief Assessment and Rationale for Refill:  Approve     Medication Therapy Plan:         Comments:     Note composed:9:23 PM 06/16/2024

## 2024-06-26 ENCOUNTER — PATIENT OUTREACH (OUTPATIENT)
Dept: ADMINISTRATIVE | Facility: HOSPITAL | Age: 64
End: 2024-06-26
Payer: MEDICARE

## 2024-06-26 NOTE — PROGRESS NOTES
Health Maintenance Due   Topic Date Due    Pneumococcal Vaccines (Age 65+) (1 of 2 - PCV) Never done    HIV Screening  Never done    RSV Vaccine (Age 60+ and Pregnant patients) (1 - 1-dose 60+ series) Never done    Abdominal Aortic Aneurysm Screening  Never done    COVID-19 Vaccine (4 - 2023-24 season) 09/01/2023     Chart review completed. HM Updated. Triggered Links. Immunizations reviewed and updated. Care Everywhere Updated. Care Team Updated.  Imported outside PSA results from [facility] into media.

## 2024-10-14 ENCOUNTER — OFFICE VISIT (OUTPATIENT)
Dept: FAMILY MEDICINE | Facility: CLINIC | Age: 64
End: 2024-10-14
Payer: MEDICARE

## 2024-10-14 ENCOUNTER — PATIENT MESSAGE (OUTPATIENT)
Dept: FAMILY MEDICINE | Facility: CLINIC | Age: 64
End: 2024-10-14

## 2024-10-14 VITALS
WEIGHT: 178.81 LBS | HEART RATE: 63 BPM | DIASTOLIC BLOOD PRESSURE: 78 MMHG | OXYGEN SATURATION: 97 % | HEIGHT: 67 IN | SYSTOLIC BLOOD PRESSURE: 142 MMHG | BODY MASS INDEX: 28.07 KG/M2

## 2024-10-14 DIAGNOSIS — Z85.46 HISTORY OF PROSTATE CANCER: ICD-10-CM

## 2024-10-14 DIAGNOSIS — R73.01 IMPAIRED FASTING GLUCOSE: ICD-10-CM

## 2024-10-14 DIAGNOSIS — E78.2 MIXED HYPERLIPIDEMIA: ICD-10-CM

## 2024-10-14 DIAGNOSIS — F41.9 ANXIETY: ICD-10-CM

## 2024-10-14 DIAGNOSIS — Z00.00 WELLNESS EXAMINATION: Primary | ICD-10-CM

## 2024-10-14 DIAGNOSIS — Z13.6 SCREENING FOR AAA (ABDOMINAL AORTIC ANEURYSM): ICD-10-CM

## 2024-10-14 DIAGNOSIS — Z23 NEED FOR VACCINATION AGAINST STREPTOCOCCUS PNEUMONIAE: ICD-10-CM

## 2024-10-14 DIAGNOSIS — K21.9 GASTROESOPHAGEAL REFLUX DISEASE WITHOUT ESOPHAGITIS: ICD-10-CM

## 2024-10-14 DIAGNOSIS — Z80.42 FAMILY HISTORY OF MALIGNANT NEOPLASM OF PROSTATE: ICD-10-CM

## 2024-10-14 DIAGNOSIS — I10 ESSENTIAL HYPERTENSION: ICD-10-CM

## 2024-10-14 DIAGNOSIS — D50.8 OTHER IRON DEFICIENCY ANEMIA: ICD-10-CM

## 2024-10-14 PROBLEM — M10.372 ACUTE GOUT DUE TO RENAL IMPAIRMENT INVOLVING LEFT FOOT: Status: ACTIVE | Noted: 2023-08-17

## 2024-10-14 PROBLEM — N40.1 BENIGN PROSTATIC HYPERPLASIA WITH NOCTURIA: Status: RESOLVED | Noted: 2023-08-17 | Resolved: 2024-10-14

## 2024-10-14 PROBLEM — R35.1 BENIGN PROSTATIC HYPERPLASIA WITH NOCTURIA: Status: RESOLVED | Noted: 2023-08-17 | Resolved: 2024-10-14

## 2024-10-14 PROCEDURE — 1160F RVW MEDS BY RX/DR IN RCRD: CPT | Mod: CPTII,S$GLB,, | Performed by: INTERNAL MEDICINE

## 2024-10-14 PROCEDURE — G2211 COMPLEX E/M VISIT ADD ON: HCPCS | Mod: S$GLB,,, | Performed by: INTERNAL MEDICINE

## 2024-10-14 PROCEDURE — 3008F BODY MASS INDEX DOCD: CPT | Mod: CPTII,S$GLB,, | Performed by: INTERNAL MEDICINE

## 2024-10-14 PROCEDURE — 3078F DIAST BP <80 MM HG: CPT | Mod: CPTII,S$GLB,, | Performed by: INTERNAL MEDICINE

## 2024-10-14 PROCEDURE — 3288F FALL RISK ASSESSMENT DOCD: CPT | Mod: CPTII,S$GLB,, | Performed by: INTERNAL MEDICINE

## 2024-10-14 PROCEDURE — 90677 PCV20 VACCINE IM: CPT | Mod: S$GLB,,, | Performed by: INTERNAL MEDICINE

## 2024-10-14 PROCEDURE — 1159F MED LIST DOCD IN RCRD: CPT | Mod: CPTII,S$GLB,, | Performed by: INTERNAL MEDICINE

## 2024-10-14 PROCEDURE — 99999 PR PBB SHADOW E&M-EST. PATIENT-LVL III: CPT | Mod: PBBFAC,,, | Performed by: INTERNAL MEDICINE

## 2024-10-14 PROCEDURE — G0009 ADMIN PNEUMOCOCCAL VACCINE: HCPCS | Mod: S$GLB,,, | Performed by: INTERNAL MEDICINE

## 2024-10-14 PROCEDURE — 99214 OFFICE O/P EST MOD 30 MIN: CPT | Mod: S$GLB,,, | Performed by: INTERNAL MEDICINE

## 2024-10-14 PROCEDURE — 3077F SYST BP >= 140 MM HG: CPT | Mod: CPTII,S$GLB,, | Performed by: INTERNAL MEDICINE

## 2024-10-14 PROCEDURE — 1101F PT FALLS ASSESS-DOCD LE1/YR: CPT | Mod: CPTII,S$GLB,, | Performed by: INTERNAL MEDICINE

## 2024-10-14 RX ORDER — ATORVASTATIN CALCIUM 10 MG/1
10 TABLET, FILM COATED ORAL NIGHTLY
Qty: 90 TABLET | Refills: 3 | Status: SHIPPED | OUTPATIENT
Start: 2024-10-14 | End: 2025-10-14

## 2024-10-14 RX ORDER — OMEPRAZOLE 20 MG/1
20 CAPSULE, DELAYED RELEASE ORAL DAILY
Qty: 90 CAPSULE | Refills: 3 | Status: SHIPPED | OUTPATIENT
Start: 2024-10-14 | End: 2025-10-14

## 2024-10-14 RX ORDER — ESCITALOPRAM OXALATE 5 MG/1
5 TABLET ORAL DAILY
Qty: 30 TABLET | Refills: 11 | Status: SHIPPED | OUTPATIENT
Start: 2024-10-14 | End: 2025-10-14

## 2024-10-14 RX ORDER — AMLODIPINE BESYLATE 5 MG/1
5 TABLET ORAL NIGHTLY
Qty: 90 TABLET | Refills: 3 | Status: SHIPPED | OUTPATIENT
Start: 2024-10-14 | End: 2025-10-14

## 2024-10-14 NOTE — PROGRESS NOTES
[facility]  Primary Care   [address]       Patient ID: Nolan Mccarthy     Chief Complaint: New Patient       Chief Complaint   Patient presents with    Annual Exam    Establish Care        HPI:  New patient here in clinic today that needs a new primary care doctor as he just moved to this area a few months ago.  He was in the process a building a house and I do wish him the best with that.  Overall, his most significant past medical history is prostate cancer which was diagnose last September.  He did 43 radiation treatments and is in remission.  He still will follow-up with his urologist in the [place] and if he ever needs a referral so going to fear he would let me know.  Last PSA was 0.44.  He does need updated labs and we will get those in a month when we reconvene.  He does wonder about the diagnose of ADD and I think it is more due to some anxiety.  He said a lot of life changing events in the past year and we agree that some Lexapro could help that.  Specifically, he has trouble focusing and trouble sleeping so I think taking Lexapro 5 mg at night we will help both of those aspects.  He does need refills on his blood pressure and cholesterol medication.  Blood pressure is still a bit elevated and I had an instance where he got dizzy brushing his teeth the other day in his systolic was 180.  It quickly recovered and repeat systolic blood pressure is 130.  As far as he knows does not have any cardiovascular disease but I do wonder if we should do some kind of peripheral workup.  He does have left hip issues and will see an orthopedic doctor at [place] shortly.  He also has a right rotator cuff injury that he is managing.  He politely declines a flu shot today but does consent to a Prevnar 20 and we will get an ultrasound of his abdominal aorta due to his smoking history.    Review of Systems       Difficulty concentrating   Left hip pain     Objective:      Physical Exam  "  Physical Exam       Normal     Vitals:   Vitals:    10/14/24 0932 10/14/24 1013   BP: (!) 158/70 (!) 142/78   Pulse: 63    SpO2: 97%    Weight: 81.1 kg (178 lb 12.7 oz)    Height: 5' 7" (1.702 m)         Assessment:           Plan:       Nolan Mccarthy  was seen today for follow-up and may need lab work.    Diagnoses and all orders for this visit:    Nolan Gaytan" was seen today for annual exam and establish care.    Diagnoses and all orders for this visit:    Wellness examination    History of prostate cancer  Status post XRT and currently in remission.  He will follow up with Urology    Need for vaccination against Streptococcus pneumoniae  -     pneumoc 20-keshia conj-dip cr(PF) (PREVNAR-20 (PF)) injection Syrg 0.5 mL    Screening for AAA (abdominal aortic aneurysm)  -     US Abdominal Aorta; Future    Mixed hyperlipidemia  -     atorvastatin (LIPITOR) 10 MG tablet; Take 1 tablet (10 mg total) by mouth every evening.  -     Comprehensive Metabolic Panel; Future  -     Lipid Panel; Future  Monitor labs on atorvastatin    Essential hypertension  -     amLODIPine (NORVASC) 5 MG tablet; Take 1 tablet (5 mg total) by mouth every evening.  Systolic blood pressure is a bit high right now but we will get another data point before changing amlodipine    Family history of malignant neoplasm of prostate    Other iron deficiency anemia  -     CBC Auto Differential; Future  -     Iron and TIBC; Future  -     Ferritin; Future  Check labs since he was taking an iron supplement 3 days week, but I want him to stop it and we   will recheck labs in a month    Gastroesophageal reflux disease without esophagitis  -     omeprazole (PRILOSEC) 20 MG capsule; Take 1 capsule (20 mg total) by mouth once daily.  Controlled with the omeprazole    Anxiety  -     EScitalopram oxalate (LEXAPRO) 5 MG Tab; Take 1 tablet (5 mg total) by mouth once daily.  Monitor on Lexapro    Impaired fasting glucose  -     Hemoglobin A1C; Future  Monitor " A1c    Visit today included increased complexity associated with the care of the episodic problem hypertension hyperlipidemia anemia GERD anxiety addressed and managing the longitudinal care of the patient due to the serious and/or complex managed problem(s) .           Nikhil Anthony MD

## 2024-10-22 ENCOUNTER — HOSPITAL ENCOUNTER (OUTPATIENT)
Dept: RADIOLOGY | Facility: HOSPITAL | Age: 64
Discharge: HOME | End: 2024-10-22
Attending: INTERNAL MEDICINE
Payer: MEDICARE

## 2024-10-22 DIAGNOSIS — Z13.6 SCREENING FOR AAA (ABDOMINAL AORTIC ANEURYSM): ICD-10-CM

## 2024-10-22 PROCEDURE — 76775 US EXAM ABDO BACK WALL LIM: CPT | Mod: TC,PO

## 2024-10-22 PROCEDURE — 76775 US EXAM ABDO BACK WALL LIM: CPT | Mod: 26,,, | Performed by: RADIOLOGY

## 2024-11-12 ENCOUNTER — LAB VISIT (OUTPATIENT)
Dept: LAB | Facility: HOSPITAL | Age: 64
End: 2024-11-12
Attending: INTERNAL MEDICINE
Payer: MEDICARE

## 2024-11-12 DIAGNOSIS — E78.2 MIXED HYPERLIPIDEMIA: ICD-10-CM

## 2024-11-12 DIAGNOSIS — D50.8 OTHER IRON DEFICIENCY ANEMIA: ICD-10-CM

## 2024-11-12 DIAGNOSIS — F41.9 ANXIETY: ICD-10-CM

## 2024-11-12 DIAGNOSIS — R73.01 IMPAIRED FASTING GLUCOSE: ICD-10-CM

## 2024-11-12 LAB
ALBUMIN SERPL BCP-MCNC: 4.2 G/DL (ref 3.5–5.2)
ALP SERPL-CCNC: 53 U/L (ref 40–150)
ALT SERPL W/O P-5'-P-CCNC: 26 U/L (ref 10–44)
ANION GAP SERPL CALC-SCNC: 10 MMOL/L (ref 8–16)
AST SERPL-CCNC: 19 U/L (ref 10–40)
BASOPHILS # BLD AUTO: 0.03 K/UL (ref 0–0.2)
BASOPHILS NFR BLD: 0.7 % (ref 0–1.9)
BILIRUB SERPL-MCNC: 0.9 MG/DL (ref 0.1–1)
BUN SERPL-MCNC: 12 MG/DL (ref 8–23)
CALCIUM SERPL-MCNC: 9.6 MG/DL (ref 8.7–10.5)
CHLORIDE SERPL-SCNC: 107 MMOL/L (ref 95–110)
CHOLEST SERPL-MCNC: 138 MG/DL (ref 120–199)
CHOLEST/HDLC SERPL: 3.2 {RATIO} (ref 2–5)
CO2 SERPL-SCNC: 25 MMOL/L (ref 23–29)
CREAT SERPL-MCNC: 1 MG/DL (ref 0.5–1.4)
DIFFERENTIAL METHOD BLD: ABNORMAL
EOSINOPHIL # BLD AUTO: 0.1 K/UL (ref 0–0.5)
EOSINOPHIL NFR BLD: 2.3 % (ref 0–8)
ERYTHROCYTE [DISTWIDTH] IN BLOOD BY AUTOMATED COUNT: 12.5 % (ref 11.5–14.5)
EST. GFR  (NO RACE VARIABLE): >60 ML/MIN/1.73 M^2
ESTIMATED AVG GLUCOSE: 108 MG/DL (ref 68–131)
FERRITIN SERPL-MCNC: 73 NG/ML (ref 20–300)
GLUCOSE SERPL-MCNC: 109 MG/DL (ref 70–110)
HBA1C MFR BLD: 5.4 % (ref 4–5.6)
HCT VFR BLD AUTO: 42.6 % (ref 40–54)
HDLC SERPL-MCNC: 43 MG/DL (ref 40–75)
HDLC SERPL: 31.2 % (ref 20–50)
HGB BLD-MCNC: 14.5 G/DL (ref 14–18)
IMM GRANULOCYTES # BLD AUTO: 0.02 K/UL (ref 0–0.04)
IMM GRANULOCYTES NFR BLD AUTO: 0.5 % (ref 0–0.5)
IRON SERPL-MCNC: 101 UG/DL (ref 45–160)
LDLC SERPL CALC-MCNC: 67.2 MG/DL (ref 63–159)
LYMPHOCYTES # BLD AUTO: 1.1 K/UL (ref 1–4.8)
LYMPHOCYTES NFR BLD: 25.4 % (ref 18–48)
MCH RBC QN AUTO: 31.8 PG (ref 27–31)
MCHC RBC AUTO-ENTMCNC: 34 G/DL (ref 32–36)
MCV RBC AUTO: 93 FL (ref 82–98)
MONOCYTES # BLD AUTO: 0.6 K/UL (ref 0.3–1)
MONOCYTES NFR BLD: 12.9 % (ref 4–15)
NEUTROPHILS # BLD AUTO: 2.6 K/UL (ref 1.8–7.7)
NEUTROPHILS NFR BLD: 58.2 % (ref 38–73)
NONHDLC SERPL-MCNC: 95 MG/DL
NRBC BLD-RTO: 0 /100 WBC
PLATELET # BLD AUTO: 208 K/UL (ref 150–450)
PMV BLD AUTO: 10.5 FL (ref 9.2–12.9)
POTASSIUM SERPL-SCNC: 4.4 MMOL/L (ref 3.5–5.1)
PROT SERPL-MCNC: 7.2 G/DL (ref 6–8.4)
RBC # BLD AUTO: 4.56 M/UL (ref 4.6–6.2)
SATURATED IRON: 26 % (ref 20–50)
SODIUM SERPL-SCNC: 142 MMOL/L (ref 136–145)
TOTAL IRON BINDING CAPACITY: 394 UG/DL (ref 250–450)
TRANSFERRIN SERPL-MCNC: 266 MG/DL (ref 200–375)
TRIGL SERPL-MCNC: 139 MG/DL (ref 30–150)
TSH SERPL DL<=0.005 MIU/L-ACNC: 0.77 UIU/ML (ref 0.4–4)
WBC # BLD AUTO: 4.41 K/UL (ref 3.9–12.7)

## 2024-11-12 PROCEDURE — 84466 ASSAY OF TRANSFERRIN: CPT | Performed by: INTERNAL MEDICINE

## 2024-11-12 PROCEDURE — 84443 ASSAY THYROID STIM HORMONE: CPT | Performed by: INTERNAL MEDICINE

## 2024-11-12 PROCEDURE — 82728 ASSAY OF FERRITIN: CPT | Performed by: INTERNAL MEDICINE

## 2024-11-12 PROCEDURE — 36415 COLL VENOUS BLD VENIPUNCTURE: CPT | Mod: PO | Performed by: INTERNAL MEDICINE

## 2024-11-12 PROCEDURE — 80061 LIPID PANEL: CPT | Performed by: INTERNAL MEDICINE

## 2024-11-12 PROCEDURE — 85025 COMPLETE CBC W/AUTO DIFF WBC: CPT | Performed by: INTERNAL MEDICINE

## 2024-11-12 PROCEDURE — 83036 HEMOGLOBIN GLYCOSYLATED A1C: CPT | Performed by: INTERNAL MEDICINE

## 2024-11-12 PROCEDURE — 80053 COMPREHEN METABOLIC PANEL: CPT | Performed by: INTERNAL MEDICINE

## 2024-11-16 ENCOUNTER — PATIENT MESSAGE (OUTPATIENT)
Dept: FAMILY MEDICINE | Facility: CLINIC | Age: 64
End: 2024-11-16
Payer: MEDICARE

## 2024-12-31 DIAGNOSIS — F41.9 ANXIETY: ICD-10-CM

## 2024-12-31 NOTE — TELEPHONE ENCOUNTER
Care Due:                  Date            Visit Type   Department     Provider  --------------------------------------------------------------------------------                                NP -                              PRIMARY      [facility]  Last Visit: 10-      CARE ([facility])   MEDICINE       Nikhil Anthony  Next Visit: None Scheduled  None         None Found                                                            Last  Test          Frequency    Reason                     Performed    Due Date  --------------------------------------------------------------------------------    Uric Acid...  12 months..  allopurinoL..............  08- 08-    [facility] Embedded Care Due Messages. Reference number: [id number].   12/31/2024 11:17:02 AM CST

## 2024-12-31 NOTE — TELEPHONE ENCOUNTER
Refill Routing Note   Medication(s) are not appropriate for processing by [facility] for the following reason(s):        New or recently adjusted medication    [facility] action(s):  Defer     Requires labs : Yes             Appointments  past 12m or future 3m with PCP    Date Provider   Last Visit   10/14/2024 Nikhil Anthony MD   Next Visit   Visit date not found Nikhil Anthony MD   ED visits in past 90 days: 0        Note composed:4:38 PM 12/31/2024

## 2024-12-31 NOTE — TELEPHONE ENCOUNTER
----- Message from Dilcia sent at 12/31/2024 11:04 AM CST -----  Contact: self  Type:  Pharmacy Calling to Clarify an RX    Name of Caller:astrid    [facility] Name[facility]     Prescription Name:EScitalopram oxalate (LEXAPRO) 5 MG Tab        Best Call Back Number:[phone]    Additional Information: sent request on the 27th, and calling to check on the request.   Please call back to advise. Thanks!

## 2025-01-01 RX ORDER — ESCITALOPRAM OXALATE 5 MG/1
5 TABLET ORAL DAILY
Qty: 90 TABLET | Refills: 3 | Status: SHIPPED | OUTPATIENT
Start: 2025-01-01 | End: 2026-01-01

## 2025-02-01 DIAGNOSIS — Z87.39 HISTORY OF GOUT: ICD-10-CM

## 2025-02-01 NOTE — TELEPHONE ENCOUNTER
No care due was identified.  [facility] Embedded Care Due Messages. Reference number: [id number].   2/01/2025 3:24:50 PM CST

## 2025-02-03 NOTE — TELEPHONE ENCOUNTER
Refill Routing Note   Medication(s) are not appropriate for processing by [facility] for the following reason(s):        Required labs outdated  No active prescription written by provider    [facility] action(s):  Defer               Appointments  past 12m or future 3m with PCP    Date Provider   Last Visit   10/14/2024 Nikhil Anthony MD   Next Visit   4/2/2025 Nikhil Anthony MD   ED visits in past 90 days: 0        Note composed:9:20 PM 02/02/2025

## 2025-02-04 ENCOUNTER — PATIENT MESSAGE (OUTPATIENT)
Dept: FAMILY MEDICINE | Facility: CLINIC | Age: 65
End: 2025-02-04
Payer: MEDICARE

## 2025-02-04 RX ORDER — ALLOPURINOL 300 MG/1
300 TABLET ORAL DAILY
Qty: 90 TABLET | Refills: 3 | Status: SHIPPED | OUTPATIENT
Start: 2025-02-04 | End: 2026-02-04

## 2025-03-07 ENCOUNTER — PATIENT MESSAGE (OUTPATIENT)
Dept: ADMINISTRATIVE | Facility: HOSPITAL | Age: 65
End: 2025-03-07
Payer: MEDICARE

## 2025-04-02 ENCOUNTER — OFFICE VISIT (OUTPATIENT)
Dept: FAMILY MEDICINE | Facility: CLINIC | Age: 65
End: 2025-04-02
Payer: MEDICARE

## 2025-04-02 VITALS
HEART RATE: 65 BPM | OXYGEN SATURATION: 97 % | DIASTOLIC BLOOD PRESSURE: 74 MMHG | SYSTOLIC BLOOD PRESSURE: 132 MMHG | HEIGHT: 67 IN | WEIGHT: 177.94 LBS | BODY MASS INDEX: 27.93 KG/M2

## 2025-04-02 DIAGNOSIS — M54.41 ACUTE RIGHT-SIDED LOW BACK PAIN WITH RIGHT-SIDED SCIATICA: Primary | ICD-10-CM

## 2025-04-02 PROCEDURE — 3008F BODY MASS INDEX DOCD: CPT | Mod: CPTII,S$GLB,, | Performed by: INTERNAL MEDICINE

## 2025-04-02 PROCEDURE — 1101F PT FALLS ASSESS-DOCD LE1/YR: CPT | Mod: CPTII,S$GLB,, | Performed by: INTERNAL MEDICINE

## 2025-04-02 PROCEDURE — 1160F RVW MEDS BY RX/DR IN RCRD: CPT | Mod: CPTII,S$GLB,, | Performed by: INTERNAL MEDICINE

## 2025-04-02 PROCEDURE — 1159F MED LIST DOCD IN RCRD: CPT | Mod: CPTII,S$GLB,, | Performed by: INTERNAL MEDICINE

## 2025-04-02 PROCEDURE — 99999 PR PBB SHADOW E&M-EST. PATIENT-LVL III: CPT | Mod: PBBFAC,,, | Performed by: INTERNAL MEDICINE

## 2025-04-02 PROCEDURE — 99213 OFFICE O/P EST LOW 20 MIN: CPT | Mod: S$GLB,,, | Performed by: INTERNAL MEDICINE

## 2025-04-02 PROCEDURE — 3288F FALL RISK ASSESSMENT DOCD: CPT | Mod: CPTII,S$GLB,, | Performed by: INTERNAL MEDICINE

## 2025-04-02 PROCEDURE — 3075F SYST BP GE 130 - 139MM HG: CPT | Mod: CPTII,S$GLB,, | Performed by: INTERNAL MEDICINE

## 2025-04-02 PROCEDURE — 3078F DIAST BP <80 MM HG: CPT | Mod: CPTII,S$GLB,, | Performed by: INTERNAL MEDICINE

## 2025-04-02 RX ORDER — METHYLPREDNISOLONE 4 MG/1
TABLET ORAL
Qty: 1 EACH | Refills: 0 | Status: SHIPPED | OUTPATIENT
Start: 2025-04-02 | End: 2025-04-23

## 2025-04-02 NOTE — PROGRESS NOTES
[facility]  Primary Care   [address]       Patient ID: Nolan Mccarthy     Chief Complaint:   Chief Complaint   Patient presents with    Annual Exam    Back Pain     Patient thinks he may have pulled a muscle in his back about 2 days ago.        HPI:  Patient complains of acute right lower back pain with what I think is a little bit of sciatica pain that occurred acutely 2 days ago as he was trying to get off the toilet.  He did not fall but everything occurred after he had a bowel movement.  Of note he usually has 1 bowel movement that has soft and formed in the morning when he 1st wakes up and then has a another larger semi formed bowel movement later in the day and that has been is normal.  Since then he has had pain isolated to his right lower back with some tingling in the right shin but the tingling actually preceded the back pain.  He has had sciatica in the past.  He was concerned about a kidney stone but this history and symptomology is more consistent with a musculoskeletal strain as the pain improves with resting in his worse when he gets up or sometimes takes a step with his right lower extremity.  I want to give him a Medrol Dosepak to help decrease inflammation in his things are not improving over the next week I want him to let me know as we will pursue imaging at that time.  He does have a history of prostate cancer but during the initial workup no metastases were found so I do not think that is the cause of his pain.  He also does not give a good history of pain radiating through his abdomen into his groin to indicate a kidney stone.  I did take a look at our labs that we got 6 months ago and they still look good.  Of note, Lexapro is controlling his anxiety very well    Review of Systems       Lumbago     Objective:      Physical Exam   Physical Exam       Decreased Range of Motion in back     Vitals:   Vitals:    04/02/25 0951   BP: 132/74   Pulse: 65   SpO2: 97%  "  Weight: 80.7 kg (177 lb 14.6 oz)   Height: 5' 7" (1.702 m)        Assessment:           Plan:       Nolan Mccarthy  was seen today for follow-up and may need lab work.    Diagnoses and all orders for this visit:    Nolan Gaytan" was seen today for annual exam and back pain.    Diagnoses and all orders for this visit:    Acute right-sided low back pain with right-sided sciatica  -     methylPREDNISolone (MEDROL DOSEPACK) 4 mg tablet; use as directed  Let me know if pain persists more than 1 week and if so we will get imaging.          Nikhil Anthony MD    "

## 2025-04-23 LAB — COMPLEXED PSA SERPL-MCNC: 0.68 NG/ML

## 2025-04-24 ENCOUNTER — PATIENT MESSAGE (OUTPATIENT)
Dept: FAMILY MEDICINE | Facility: CLINIC | Age: 65
End: 2025-04-24
Payer: MEDICARE

## 2025-06-11 ENCOUNTER — PATIENT OUTREACH (OUTPATIENT)
Dept: ADMINISTRATIVE | Facility: HOSPITAL | Age: 65
End: 2025-06-11
Payer: MEDICARE

## 2025-07-25 ENCOUNTER — E-VISIT (OUTPATIENT)
Dept: FAMILY MEDICINE | Facility: CLINIC | Age: 65
End: 2025-07-25
Payer: MEDICARE

## 2025-07-25 DIAGNOSIS — M54.41 ACUTE RIGHT-SIDED LOW BACK PAIN WITH RIGHT-SIDED SCIATICA: Primary | ICD-10-CM

## 2025-07-25 PROCEDURE — 99421 OL DIG E/M SVC 5-10 MIN: CPT | Mod: ,,, | Performed by: INTERNAL MEDICINE

## 2025-07-27 RX ORDER — METHYLPREDNISOLONE 4 MG/1
TABLET ORAL
Qty: 1 EACH | Refills: 0 | Status: SHIPPED | OUTPATIENT
Start: 2025-07-27 | End: 2025-08-17

## 2025-07-27 NOTE — PROGRESS NOTES
Patient ID: Nolan Mccarthy is a 64 y.o. male.        E-Visit Time Tracking:   Day 1 Time (in minutes): 5  Total Time (in minutes): 5      Chief Complaint: General Illness (Entered automatically based on patient selection in [email or web address].)      The patient initiated a request through [email or web address] on 7/25/2025 for evaluation and management with a chief complaint of General Illness (Entered automatically based on patient selection in [email or web address].)     I evaluated the questionnaire submission on 07/27/2025.    [facility] E[facility]-Common Problems    7/25/2025  2:02 PM CDT - Filed by Patient   What do you need help with? Back Problem   Do you agree to participate in an E-Visit? Yes   If you have any of the following symptoms, please present to your local emergency room or call 911: I acknowledge   What is the main issue you would like addressed today? I am having the same back issue that we discussed during my April visit and you prescribed Medrol Dosepak. Can you order me another script for me to pickup at [facility] at [address]   Where is your back pain located? (Upper Back, Middle Back, Lower Back, Buttocks) Lower Back   Does the pain extend into your legs? (Left leg, Right leg, Both legs, None) Right leg   On a scale of 1-10, where 10 is the worst pain imaginable, how severe is your back pain? 8   Did you have an injury that could have caused the pain? No   How long have you had back pain? (Just today, About a week, About a month, More than a month) About a week   Have you experienced similar back pain in the past? (Yes, Sometimes, This pain is new, Never) Yes   Please list any medications or treatments you have used for back pain and indicate if it was effective or not. Medrol Dosepak   Do you have a fever? (101°F or under, Over 101°F, No, Not sure) No   Do you have any of the following? (Areas that are numb, tingle, or feel strange, Discomfort or trouble when urinating, Fatigue, New loss of bowel or bladder control,  Loss of appetite, Unexpected weight loss, Weakness, Rash in area of pain, None) None   What makes the pain worse? (Any movement, Bending over, Sitting down, Intense activity, Other, None of the above) Other   What makes the pain worse? Gettyng up from sitting   What makes the pain better? (Hot or cold compress, Leaning forward, Lying in bed, Over the counter pain medicine, Prescription pain medicine, Other, None) Hot or cold compress   Have you ever been diagnosed with cancer? Yes   Have you ever been diagnosed with arthritis of the spine (also called degenerative disc disease)? No   Have you ever been diagnosed with osteoporosis or any other condition that causes weak bones? No   Have you ever had surgery on your back or spine? No   How would you describe your usual activity level? (Active - regular exercise/physical activity, Moderately active - some movement, but no regular exercise, Sedentary - mostly sitting, little activity, Limited mobility - restricted movement) Active - regular exercise or physical activity   Provide any information you feel is important to your history not asked above    Please attach any relevant images or files    Are you able to take your vitals? No         Encounter Diagnosis   Name Primary?    Acute right-sided low back pain with right-sided sciatica Yes        No orders of the defined types were placed in this encounter.     Medications Ordered This Encounter   Medications    methylPREDNISolone (MEDROL DOSEPACK) 4 mg tablet     Sig: use as directed     Dispense:  1 each     Refill:  0        No follow-ups on file.

## 2025-08-11 ENCOUNTER — PATIENT MESSAGE (OUTPATIENT)
Dept: FAMILY MEDICINE | Facility: CLINIC | Age: 65
End: 2025-08-11
Payer: MEDICARE

## 2025-08-11 DIAGNOSIS — M54.41 ACUTE RIGHT-SIDED LOW BACK PAIN WITH RIGHT-SIDED SCIATICA: Primary | ICD-10-CM

## 2025-08-19 ENCOUNTER — PATIENT MESSAGE (OUTPATIENT)
Dept: FAMILY MEDICINE | Facility: CLINIC | Age: 65
End: 2025-08-19
Payer: MEDICARE

## 2025-08-24 ENCOUNTER — RESULTS FOLLOW-UP (OUTPATIENT)
Dept: FAMILY MEDICINE | Facility: CLINIC | Age: 65
End: 2025-08-24
Payer: MEDICARE

## 2025-08-24 DIAGNOSIS — M54.41 ACUTE RIGHT-SIDED LOW BACK PAIN WITH RIGHT-SIDED SCIATICA: Primary | ICD-10-CM
